# Patient Record
Sex: FEMALE | Race: BLACK OR AFRICAN AMERICAN | NOT HISPANIC OR LATINO | Employment: UNEMPLOYED | URBAN - METROPOLITAN AREA
[De-identification: names, ages, dates, MRNs, and addresses within clinical notes are randomized per-mention and may not be internally consistent; named-entity substitution may affect disease eponyms.]

---

## 2023-10-29 ENCOUNTER — HOSPITAL ENCOUNTER (EMERGENCY)
Facility: CLINIC | Age: 23
Discharge: HOME | End: 2023-10-29
Attending: STUDENT IN AN ORGANIZED HEALTH CARE EDUCATION/TRAINING PROGRAM
Payer: MEDICAID

## 2023-10-29 ENCOUNTER — APPOINTMENT (OUTPATIENT)
Dept: ULTRASOUND IMAGING | Facility: CLINIC | Age: 23
End: 2023-10-29
Attending: STUDENT IN AN ORGANIZED HEALTH CARE EDUCATION/TRAINING PROGRAM
Payer: MEDICAID

## 2023-10-29 VITALS
BODY MASS INDEX: 29.28 KG/M2 | HEIGHT: 68 IN | WEIGHT: 193.2 LBS | RESPIRATION RATE: 18 BRPM | OXYGEN SATURATION: 97 % | DIASTOLIC BLOOD PRESSURE: 75 MMHG | TEMPERATURE: 98.5 F | SYSTOLIC BLOOD PRESSURE: 113 MMHG | HEART RATE: 93 BPM

## 2023-10-29 DIAGNOSIS — O20.0 THREATENED ABORTION: ICD-10-CM

## 2023-10-29 DIAGNOSIS — O20.9 VAGINAL BLEEDING IN PREGNANCY, FIRST TRIMESTER: ICD-10-CM

## 2023-10-29 LAB
ABO/RH(D): NORMAL
ACANTHOCYTES BLD QL SMEAR: NORMAL
ALBUMIN SERPL BCG-MCNC: 4.3 G/DL (ref 3.5–5.2)
ALP SERPL-CCNC: 67 U/L (ref 35–104)
ALT SERPL W P-5'-P-CCNC: 17 U/L (ref 0–50)
ANION GAP SERPL CALCULATED.3IONS-SCNC: 9 MMOL/L (ref 7–15)
ANTIBODY SCREEN: NEGATIVE
AST SERPL W P-5'-P-CCNC: 18 U/L (ref 0–45)
AUER BODIES BLD QL SMEAR: NORMAL
BASO STIPL BLD QL SMEAR: NORMAL
BASOPHILS # BLD AUTO: 0 10E3/UL (ref 0–0.2)
BASOPHILS NFR BLD AUTO: 1 %
BILIRUB SERPL-MCNC: 0.4 MG/DL
BITE CELLS BLD QL SMEAR: NORMAL
BLISTER CELLS BLD QL SMEAR: NORMAL
BUN SERPL-MCNC: 5.7 MG/DL (ref 6–20)
BURR CELLS BLD QL SMEAR: NORMAL
CALCIUM SERPL-MCNC: 9.2 MG/DL (ref 8.6–10)
CHLORIDE SERPL-SCNC: 100 MMOL/L (ref 98–107)
CREAT SERPL-MCNC: 0.4 MG/DL (ref 0.51–0.95)
DACRYOCYTES BLD QL SMEAR: NORMAL
DEPRECATED HCO3 PLAS-SCNC: 24 MMOL/L (ref 22–29)
EGFRCR SERPLBLD CKD-EPI 2021: >90 ML/MIN/1.73M2
ELLIPTOCYTES BLD QL SMEAR: NORMAL
EOSINOPHIL # BLD AUTO: 0.1 10E3/UL (ref 0–0.7)
EOSINOPHIL NFR BLD AUTO: 2 %
ERYTHROCYTE [DISTWIDTH] IN BLOOD BY AUTOMATED COUNT: 20.1 % (ref 10–15)
FRAGMENTS BLD QL SMEAR: NORMAL
GLUCOSE SERPL-MCNC: 96 MG/DL (ref 70–99)
HCG INTACT+B SERPL-ACNC: ABNORMAL MIU/ML
HCT VFR BLD AUTO: 26.9 % (ref 35–47)
HGB BLD-MCNC: 7.3 G/DL (ref 11.7–15.7)
HGB C CRYSTALS: NORMAL
HOWELL-JOLLY BOD BLD QL SMEAR: NORMAL
IMM GRANULOCYTES # BLD: 0 10E3/UL
IMM GRANULOCYTES NFR BLD: 0 %
LYMPHOCYTES # BLD AUTO: 2.4 10E3/UL (ref 0.8–5.3)
LYMPHOCYTES NFR BLD AUTO: 40 %
MCH RBC QN AUTO: 17.3 PG (ref 26.5–33)
MCHC RBC AUTO-ENTMCNC: 27.1 G/DL (ref 31.5–36.5)
MCV RBC AUTO: 64 FL (ref 78–100)
MONOCYTES # BLD AUTO: 0.6 10E3/UL (ref 0–1.3)
MONOCYTES NFR BLD AUTO: 9 %
NEUTROPHILS # BLD AUTO: 2.9 10E3/UL (ref 1.6–8.3)
NEUTROPHILS NFR BLD AUTO: 48 %
NEUTS HYPERSEG BLD QL SMEAR: NORMAL
NRBC # BLD AUTO: 0 10E3/UL
NRBC BLD AUTO-RTO: 0 /100
PLAT MORPH BLD: NORMAL
PLATELET # BLD AUTO: 490 10E3/UL (ref 150–450)
POLYCHROMASIA BLD QL SMEAR: NORMAL
POTASSIUM SERPL-SCNC: 3.7 MMOL/L (ref 3.4–5.3)
PROT SERPL-MCNC: 7.9 G/DL (ref 6.4–8.3)
RBC # BLD AUTO: 4.23 10E6/UL (ref 3.8–5.2)
RBC AGGLUT BLD QL: NORMAL
RBC MORPH BLD: NORMAL
ROULEAUX BLD QL SMEAR: NORMAL
SICKLE CELLS BLD QL SMEAR: NORMAL
SMUDGE CELLS BLD QL SMEAR: NORMAL
SODIUM SERPL-SCNC: 133 MMOL/L (ref 135–145)
SPECIMEN EXPIRATION DATE: NORMAL
SPHEROCYTES BLD QL SMEAR: NORMAL
STOMATOCYTES BLD QL SMEAR: NORMAL
TARGETS BLD QL SMEAR: NORMAL
TOXIC GRANULES BLD QL SMEAR: NORMAL
VARIANT LYMPHS BLD QL SMEAR: NORMAL
WBC # BLD AUTO: 6 10E3/UL (ref 4–11)

## 2023-10-29 PROCEDURE — 80053 COMPREHEN METABOLIC PANEL: CPT | Performed by: STUDENT IN AN ORGANIZED HEALTH CARE EDUCATION/TRAINING PROGRAM

## 2023-10-29 PROCEDURE — 99284 EMERGENCY DEPT VISIT MOD MDM: CPT | Performed by: STUDENT IN AN ORGANIZED HEALTH CARE EDUCATION/TRAINING PROGRAM

## 2023-10-29 PROCEDURE — 250N000011 HC RX IP 250 OP 636: Mod: JZ | Performed by: STUDENT IN AN ORGANIZED HEALTH CARE EDUCATION/TRAINING PROGRAM

## 2023-10-29 PROCEDURE — 85025 COMPLETE CBC W/AUTO DIFF WBC: CPT | Performed by: STUDENT IN AN ORGANIZED HEALTH CARE EDUCATION/TRAINING PROGRAM

## 2023-10-29 PROCEDURE — 96372 THER/PROPH/DIAG INJ SC/IM: CPT | Performed by: STUDENT IN AN ORGANIZED HEALTH CARE EDUCATION/TRAINING PROGRAM

## 2023-10-29 PROCEDURE — 76817 TRANSVAGINAL US OBSTETRIC: CPT

## 2023-10-29 PROCEDURE — 86850 RBC ANTIBODY SCREEN: CPT | Performed by: STUDENT IN AN ORGANIZED HEALTH CARE EDUCATION/TRAINING PROGRAM

## 2023-10-29 PROCEDURE — 36415 COLL VENOUS BLD VENIPUNCTURE: CPT | Performed by: STUDENT IN AN ORGANIZED HEALTH CARE EDUCATION/TRAINING PROGRAM

## 2023-10-29 PROCEDURE — 99285 EMERGENCY DEPT VISIT HI MDM: CPT | Mod: 25 | Performed by: STUDENT IN AN ORGANIZED HEALTH CARE EDUCATION/TRAINING PROGRAM

## 2023-10-29 PROCEDURE — 76801 OB US < 14 WKS SINGLE FETUS: CPT

## 2023-10-29 PROCEDURE — 84702 CHORIONIC GONADOTROPIN TEST: CPT | Performed by: STUDENT IN AN ORGANIZED HEALTH CARE EDUCATION/TRAINING PROGRAM

## 2023-10-29 PROCEDURE — 86901 BLOOD TYPING SEROLOGIC RH(D): CPT | Performed by: STUDENT IN AN ORGANIZED HEALTH CARE EDUCATION/TRAINING PROGRAM

## 2023-10-29 RX ADMIN — HUMAN RHO(D) IMMUNE GLOBULIN 300 MCG: 1500 SOLUTION INTRAMUSCULAR; INTRAVENOUS at 23:38

## 2023-10-29 ASSESSMENT — ACTIVITIES OF DAILY LIVING (ADL)
ADLS_ACUITY_SCORE: 35

## 2023-10-29 NOTE — ED PROVIDER NOTES
"    [facility] EMERGENCY DEPARTMENT ([facility])    10/29/23      ED PROVIDER NOTE   Ashley RIVERA  History     Chief Complaint   Patient presents with    Vaginal Bleeding - Pregnant     Patient states with , \"I am pregnant.\" The patient states to have saw some bleeding, she is around 7 weeks pregnant. A couple days ago she saw a little bit of bleeding, and today she saw it again, but it is not clotted blood. She states to have felt wetness, and wants it checked. Denies abdominal pain.      The history is provided by the patient and medical records.     Frantz Rose is a 22 year old [place] female G1, P0 blood type O- at 7 weeks 2 days gestation based on ultrasound LAURA of 06/14/2024 who presents to the emergency department with vaginal bleeding.  Patient states that she is approximately 7 weeks pregnant, has an ultrasound report from 10/24/2023.  A couple of days ago she had some light spotting, had recurrence of this again today.  She would like to be evaluated.  No abdominal pain, no passage of blood clots.    No prior [facility] records exist for the patient on this chart.  Will look for potential duplicate chart or [facility].    [facility] patient was seen at that emergency department on 10/25/2023.  She reported last menstrual period of 9/3/2023 as well as history of anemia.  She reported having mild lower abdominal pain that started around 10/23/2023 along with breast tenderness.  She underwent ultrasound that showed single viable intrauterine pregnancy. The embryo's crown rump length measurement of 7 mm corresponds to a gestational age of 6 weeks 5 days with a sonographic due date of 06/14/2024.  She had lab work showing O- blood type, quantitative hCG of 43,863.  UA was notable for increased urobilinogen but otherwise negative.  CBC notable for hemoglobin of 7.2, unclear what patient's baseline is.  Unable to " "obtain any records prior to this ED visit that she had 4 days ago.    Past Medical History  No past medical history on file.  No past surgical history on file.  No current outpatient medications on file.    No Known Allergies  Family History  No family history on file.  Social History          A medically appropriate review of systems was performed with pertinent positives and negatives noted in the HPI, and all other systems negative.    Physical Exam   BP: 118/82  Pulse: 93  Temp: 98.5  F (36.9  C)  Resp: 18  Height: 173 cm (5' 8.11\")  Weight: 87.6 kg (193 lb 3.2 oz)  SpO2: 100 %    Physical Exam  GEN: Well appearing, non toxic, cooperative  HEENT: normocephalic and atraumatic, PERRLA, EOMI  CV: well-perfused, normal skin color for ethnicity  PULM: breathing comfortably, in no respiratory distress  ABD: nondistended  EXT: Full range of motion.  No edema.  NEURO: awake, conversant, grossly normal bilateral upper and lower extremity strength & ROM   SKIN: No rashes, ecchymosis, or lacerations  PSYCH: Calm and cooperative, interactive      ED Course, Procedures, & Data      Procedures                 Results for orders placed or performed during the hospital encounter of 10/29/23   US OB < 14 Weeks Single     Status: None    Narrative    EXAM: US OB < 14 WEEKS SINGLE-TRANSABDOMINAL  LOCATION: [facility]  DATE: 10/29/2023    INDICATION: Early pregnancy, abdominal pain.  COMPARISON: None.  TECHNIQUE: Transabdominal scans were performed. Endovaginal ultrasound was performed to better visualize the embryo.    FINDINGS:  UTERUS: Single intrauterine gestation sac.  CRL: Measures 4 cm, equals 6 weeks 1 day.  FETAL HEART RATE: No fetal heart tones.  AMNIOTIC FLUID: Normal.  PLACENTA: Not yet formed. No evidence for sub-chorionic hemorrhage.    RIGHT OVARY: Small cyst.  LEFT OVARY: Not visualized due to overlying bowel gas.      Impression    IMPRESSION:   1.  Single intrauterine " gestation at 6 weeks 1 day, EDC 06/22/2024.    2.  No fetal heart tones. Findings suspicious but not definitive for failed pregnancy. Short-term follow-up recommended.    NOTE: ABNORMAL REPORT    THE DICTATION ABOVE DESCRIBES AN ABNORMALITY FOR WHICH FOLLOW-UP IS NEEDED.        US OB Transvaginal Only     Status: None    Narrative    EXAM: US OB TRANSVAGINAL ONLY  LOCATION: [facility]  DATE: 10/29/2023    INDICATION: vaginal bleeding, possible miscarriage, already had TA, wants transvaginal us now  COMPARISON: Earlier today at 2000 hours  TECHNIQUE: Endovaginal ultrasound was performed to better visualize the embryo.    FINDINGS:  UTERUS: Single normal appearing intrauterine gestation sac.  CRL: Measures 0.5 cm, equals 6 weeks 2 days.  FETAL HEART RATE: Not detected.  AMNIOTIC FLUID: Normal.  PLACENTA: Not yet formed. Tiny sub-chorionic hemorrhage.    Neither ovary identified, obscured by bowel gas. No free fluid.       Impression    IMPRESSION:   1.  No significant change compared to earlier exam. Small 5 mm embryo identified with no cardiac activity evident.  2.  Findings remain suspicious but not yet diagnostic of first trimester demise. As indicated clinically, suggest follow-up exam in one week to reevaluate.    Findings Suspicious, But Not Diagnostic of Pregnancy Failure    CRL< 7mm and no FHR    Reference: Diagnostic Criteria for Nonviable Pregnancy Early in the First Trimester. Ultrasound Quarterly; 30(1): 3-9, March 2014   Comprehensive metabolic panel     Status: Abnormal   Result Value Ref Range    Sodium 133 (L) 135 - 145 mmol/L    Potassium 3.7 3.4 - 5.3 mmol/L    Carbon Dioxide (CO2) 24 22 - 29 mmol/L    Anion Gap 9 7 - 15 mmol/L    Urea Nitrogen 5.7 (L) 6.0 - 20.0 mg/dL    Creatinine 0.40 (L) 0.51 - 0.95 mg/dL    GFR Estimate >90 >60 mL/min/1.73m2    Calcium 9.2 8.6 - 10.0 mg/dL    Chloride 100 98 - 107 mmol/L    Glucose 96 70 - 99 mg/dL    Alkaline  Phosphatase 67 35 - 104 U/L    AST 18 0 - 45 U/L    ALT 17 0 - 50 U/L    Protein Total 7.9 6.4 - 8.3 g/dL    Albumin 4.3 3.5 - 5.2 g/dL    Bilirubin Total 0.4 <=1.2 mg/dL   HCG quantitative pregnancy     Status: Abnormal   Result Value Ref Range    hCG Quantitative 33,388 (H) <5 mIU/mL   CBC with platelets and differential     Status: Abnormal   Result Value Ref Range    WBC Count 6.0 4.0 - 11.0 10e3/uL    RBC Count 4.23 3.80 - 5.20 10e6/uL    Hemoglobin 7.3 (L) 11.7 - 15.7 g/dL    Hematocrit 26.9 (L) 35.0 - 47.0 %    MCV 64 (L) 78 - 100 fL    MCH 17.3 (L) 26.5 - 33.0 pg    MCHC 27.1 (L) 31.5 - 36.5 g/dL    RDW 20.1 (H) 10.0 - 15.0 %    Platelet Count 490 (H) 150 - 450 10e3/uL    % Neutrophils 48 %    % Lymphocytes 40 %    % Monocytes 9 %    % Eosinophils 2 %    % Basophils 1 %    % Immature Granulocytes 0 %    NRBCs per 100 WBC 0 <1 /100    Absolute Neutrophils 2.9 1.6 - 8.3 10e3/uL    Absolute Lymphocytes 2.4 0.8 - 5.3 10e3/uL    Absolute Monocytes 0.6 0.0 - 1.3 10e3/uL    Absolute Eosinophils 0.1 0.0 - 0.7 10e3/uL    Absolute Basophils 0.0 0.0 - 0.2 10e3/uL    Absolute Immature Granulocytes 0.0 <=0.4 10e3/uL    Absolute NRBCs 0.0 10e3/uL   RBC and Platelet Morphology     Status: None   Result Value Ref Range    Platelet Assessment  Automated Count Confirmed. Platelet morphology is normal.     Automated Count Confirmed. Platelet morphology is normal.    Acanthocytes      Onelia Rods      Basophilic Stippling      Bite Cells      Blister Cells      [place] Cells      Elliptocytes      Hgb C Crystals      Vasquez-Jolly Bodies      Hypersegmented Neutrophils      Polychromasia      RBC agglutination      RBC Fragments      Reactive Lymphocytes      Rouleaux      Sickle Cells      Smudge Cells      Spherocytes      Stomatocytes      Target Cells      Teardrop Cells      Toxic Neutrophils      RBC Morphology Confirmed RBC Indices    Adult Type and Screen     Status: None   Result Value Ref Range    ABO/RH(D) O NEG      Antibody Screen Negative Negative    SPECIMEN EXPIRATION DATE [id number]    CBC with platelets differential     Status: Abnormal    Narrative    The following orders were created for panel order CBC with platelets differential.  Procedure                               Abnormality         Status                     ---------                               -----------         ------                     CBC with platelets and d...[[id number]]  Abnormal            Final result               RBC and Platelet Morphology[[id number]]                      Final result                 Please view results for these tests on the individual orders.   ABO/Rh type and screen     Status: None    Narrative    The following orders were created for panel order ABO/Rh type and screen.  Procedure                               Abnormality         Status                     ---------                               -----------         ------                     Adult Type and Screen[[id number]]                            Final result                 Please view results for these tests on the individual orders.     Medications   rho(D) immune globulin (RHOPHYLAC) injection 300 mcg (has no administration in time range)     Or   rho(D) immune globulin (RHOPHYLAC) injection 300 mcg (has no administration in time range)     Labs Ordered and Resulted from Time of ED Arrival to Time of ED Departure   COMPREHENSIVE METABOLIC PANEL - Abnormal       Result Value    Sodium 133 (*)     Potassium 3.7      Carbon Dioxide (CO2) 24      Anion Gap 9      Urea Nitrogen 5.7 (*)     Creatinine 0.40 (*)     GFR Estimate >90      Calcium 9.2      Chloride 100      Glucose 96      Alkaline Phosphatase 67      AST 18      ALT 17      Protein Total 7.9      Albumin 4.3      Bilirubin Total 0.4     HCG QUANTITATIVE PREGNANCY - Abnormal    hCG Quantitative 33,388 (*)    CBC WITH PLATELETS AND DIFFERENTIAL - Abnormal    WBC Count 6.0      RBC Count 4.23       Hemoglobin 7.3 (*)     Hematocrit 26.9 (*)     MCV 64 (*)     MCH 17.3 (*)     MCHC 27.1 (*)     RDW 20.1 (*)     Platelet Count 490 (*)     % Neutrophils 48      % Lymphocytes 40      % Monocytes 9      % Eosinophils 2      % Basophils 1      % Immature Granulocytes 0      NRBCs per 100 WBC 0      Absolute Neutrophils 2.9      Absolute Lymphocytes 2.4      Absolute Monocytes 0.6      Absolute Eosinophils 0.1      Absolute Basophils 0.0      Absolute Immature Granulocytes 0.0      Absolute NRBCs 0.0     RBC AND PLATELET MORPHOLOGY    Platelet Assessment        Value: Automated Count Confirmed. Platelet morphology is normal.    Acanthocytes        Onelia Rods        Basophilic Stippling        Bite Cells        Blister Cells        [place] Cells        Elliptocytes        Hgb C Crystals        Vasquez-Jolly Bodies        Hypersegmented Neutrophils        Polychromasia        RBC agglutination        RBC Fragments        Reactive Lymphocytes        Rouleaux        Sickle Cells        Smudge Cells        Spherocytes        Stomatocytes        Target Cells        Teardrop Cells        Toxic Neutrophils        RBC Morphology Confirmed RBC Indices     ROUTINE UA WITH MICROSCOPIC REFLEX TO CULTURE   TYPE AND SCREEN, ADULT    ABO/RH(D) O NEG      Antibody Screen Negative      SPECIMEN EXPIRATION DATE [id number]     ABO/RH TYPE AND SCREEN     US OB Transvaginal Only   Final Result   IMPRESSION:    1.  No significant change compared to earlier exam. Small 5 mm embryo identified with no cardiac activity evident.   2.  Findings remain suspicious but not yet diagnostic of first trimester demise. As indicated clinically, suggest follow-up exam in one week to reevaluate.      Findings Suspicious, But Not Diagnostic of Pregnancy Failure      CRL< 7mm and no FHR      Reference: Diagnostic Criteria for Nonviable Pregnancy Early in the First Trimester. Ultrasound Quarterly; 30(1): 3-9, March 2014      US OB < 14 Weeks Single   Final  Result   IMPRESSION:    1.  Single intrauterine gestation at 6 weeks 1 day, EDC 06/22/2024.      2.  No fetal heart tones. Findings suspicious but not definitive for failed pregnancy. Short-term follow-up recommended.      NOTE: ABNORMAL REPORT      THE DICTATION ABOVE DESCRIBES AN ABNORMALITY FOR WHICH FOLLOW-UP IS NEEDED.                    Critical care was not performed.     Medical Decision Making  The patient's presentation was of moderate complexity (an undiagnosed new problem with uncertain diagnosis).    The patient's evaluation involved:  review of external note(s) from 1 sources (see separate area of note for details)  review of 3+ test result(s) ordered prior to this encounter (see separate area of note for details)  ordering and/or review of 3+ test(s) in this encounter (see separate area of note for details)    The patient's management necessitated high risk (a decision regarding hospitalization).    Assessment & Plan    Frantz Rose is a 22 year old [place] female G1, P0 blood type O- at 7 weeks 2 days gestation based on ultrasound LAURA of 06/14/2024 who presents to the emergency department with vaginal bleeding.    Patient notes she had some vaginal spotting last week as well as abdominal pain.  She was evaluated and had an ultrasound done 4 days ago which demonstrated an intrauterine pregnancy with a difficult to discern but present fetal heart rate at 150 bpm.    Patient had some further vaginal bleeding today, and at this point in time still highly concerning with no evidence of any fetal heart tones at this time.  My concern is that with his ongoing vaginal bleeding, and inability to find fetal heart tones now that this represents a miscarriage.  Patient is aware of the concern, and will plan to follow-up with her OB/GYN within the next week.    Patient is Rh- and we did discuss the fact that she has significant anemia at baseline and if she has further significant bleeding that her hemoglobin is  already low enough that she may need a blood transfusion.    We will plan for discharge and follow-up with our OB doctors in the next week for further discussion.    I have reviewed the nursing notes. I have reviewed the findings, diagnosis, plan and need for follow up with the patient.    New Prescriptions    No medications on file       Final diagnoses:   Vaginal bleeding in pregnancy, first trimester   Threatened      I, Estefanía Bowers, am serving as a trained medical scribe to document services personally performed by Farideh Rowan MD based on the provider's statements to me on 2023.  This document has been checked and approved by the attending provider.    I, Farideh Rowan MD, was physically present and have reviewed and verified the accuracy of this note documented by Estefanía Bowers, medical scribe.      Farideh Rowan MD     [facility] EMERGENCY DEPARTMENT  10/29/2023     Farideh Rowan MD  10/29/23 3446

## 2023-10-29 NOTE — ED TRIAGE NOTES
"Patient states with , \"I am pregnant.\" The patient states to have saw some bleeding, she is around 7 weeks pregnant. A couple days ago she saw a little bit of bleeding, and today she saw it again, but it is not clotted blood. She states to have felt wetness, and wants it checked. Denies abdominal pain.      Triage Assessment (Adult)       Row Name 10/29/23 7714          Triage Assessment    Airway WDL WDL        Respiratory WDL    Respiratory WDL WDL        Skin Circulation/Temperature WDL    Skin Circulation/Temperature WDL WDL        Cardiac WDL    Cardiac WDL WDL        Peripheral/Neurovascular WDL    Peripheral Neurovascular WDL WDL        Cognitive/Neuro/Behavioral WDL    Cognitive/Neuro/Behavioral WDL WDL                     "

## 2023-10-29 NOTE — ED PROVIDER NOTES
"ED Provider Note  [facility]      History     Chief Complaint   Patient presents with    Vaginal Bleeding - Pregnant     Patient states with , \"I am pregnant.\" The patient states to have saw some bleeding, she is around 7 weeks pregnant. A couple days ago she saw a little bit of bleeding, and today she saw it again, but it is not clotted blood. She states to have felt wetness, and wants it checked. Denies abdominal pain.      HPI  Frantz Rose is a 22 year old G1, P0 at 8 weeks gestation female presenting to the emergency department due to vaginal bleeding.    Patient notes that she was having some abdominal cramping and was actually evaluated in outside ER at which time she had an ultrasound on 10/24, and was told at that time that she had an intrauterine pregnancy with a fetus that was alive and noted to be 7 weeks 2 days.  Notes that shortly after that she had some bright red vaginal spotting for a day.  She did not have an intravaginal  Ultrasound at that time.  The spotting went away, and the abdominal pain overall went away.  However today she also had some bright red spotting again.  No clots, no ongoing bleeding at this time.  No significant abdominal pain, nausea, vomiting, dysuria, constipation.  Does not feel as though she is stating straining to have bowel movements    Past Medical History  No past medical history on file.  No past surgical history on file.  No current outpatient medications on file.    No Known Allergies  Family History  No family history on file.  Social History          A medically appropriate review of systems was performed with pertinent positives and negatives noted in the HPI, and all other systems negative.    Physical Exam   BP: 118/82  Pulse: 93  Temp: 98.5  F (36.9  C)  Resp: 18  Height: 173 cm (5' 8.11\")  Weight: 87.6 kg (193 lb 3.2 oz)  SpO2: 100 %  Physical Exam  GEN: Well appearing, non toxic, cooperative  HEENT: normocephalic and " atraumatic, PERRLA, EOMI  CV: well-perfused, normal skin color for ethnicity, RRR  PULM: breathing comfortably, in no respiratory distress, CABL  ABD: nondistended, Soft, nontender  EXT: Full range of motion.  No edema.  NEURO: awake, conversant, grossly normal bilateral upper and lower extremity strength & ROM   SKIN: No rashes, ecchymosis, or lacerations  PSYCH: Calm and cooperative, interactive      ED Course, Procedures, & Data      Procedures        Results for orders placed or performed during the hospital encounter of 10/29/23   Adult Type and Screen     Status: None (Preliminary result)   Result Value Ref Range    SPECIMEN EXPIRATION DATE [id number]    CBC with platelets differential     Status: None (In process)    Narrative    The following orders were created for panel order CBC with platelets differential.  Procedure                               Abnormality         Status                     ---------                               -----------         ------                     CBC with platelets and d...[[id number]]                      In process                   Please view results for these tests on the individual orders.   ABO/Rh type and screen     Status: None (In process)    Narrative    The following orders were created for panel order ABO/Rh type and screen.  Procedure                               Abnormality         Status                     ---------                               -----------         ------                     Adult Type and Screen[[id number]]                            Preliminary result           Please view results for these tests on the individual orders.     Medications - No data to display  Labs Ordered and Resulted from Time of ED Arrival to Time of ED Departure   COMPREHENSIVE METABOLIC PANEL   ROUTINE UA WITH MICROSCOPIC REFLEX TO CULTURE   HCG QUANTITATIVE PREGNANCY   CBC WITH PLATELETS AND DIFFERENTIAL   TYPE AND SCREEN, ADULT       Result Value    SPECIMEN  EXPIRATION DATE [id number]     ABO/RH TYPE AND SCREEN     US Pelvic Complete w Transvaginal    (Results Pending)   US OB <14 Weeks W Transvaginal    (Results Pending)          Critical care was not performed.     Medical Decision Making  The patient's presentation was of moderate complexity (an undiagnosed new problem with uncertain diagnosis).    The patient's evaluation involved:  review of external note(s) from 1 sources (pt showed me US report on her phone)    The patient's management necessitated only low risk treatment.    Assessment & Plan    22-year-old previously healthy G1, P0 female presenting to the emergency department at 8 weeks gestational age due to vaginal spotting in the setting of her pregnancy.  No significant abdominal tenderness, no evidence of hemodynamic instability and patient is not significantly bleeding at this time.    Unclear cause of spotting at this time.  We will plan for screening labs including urinalysis, and ultrasound at this time.  Patient is declining transvaginal, however her transabdominal ultrasound was able to visualize the fetus previously    Considered either normal vaginal spotting, urinary tract infection that is asymptomatic, impending miscarriage, unlikely hemorrhage    US without heartbeat. Last US was difficult to see HB. Possible early pregnancy vs threatened/missed . Pt aware. I spoke with OB who will plan for eval in the next week. PT aware of return precautions    I have reviewed the nursing notes. I have reviewed the findings, diagnosis, plan and need for follow up with the patient.    New Prescriptions    No medications on file       Final diagnoses:   Vaginal bleeding in pregnancy, first trimester       Farideh Rowan MD  [facility] EMERGENCY DEPARTMENT  10/29/2023     Farideh Rowan MD  10/30/23 0040

## 2023-10-30 ENCOUNTER — TELEPHONE (OUTPATIENT)
Dept: OBGYN | Facility: CLINIC | Age: 23
End: 2023-10-30
Payer: MEDICAID

## 2023-10-30 NOTE — DISCHARGE INSTRUCTIONS
You are seen emergency department due to vaginal bleeding.  As you had a recent ultrasound that showed a growing fetus with heartbeat at that time and today there is no evidence of any heartbeat, I am very concerned that this means that you have had a miscarriage.  Because you already have very low blood counts to begin with, vaginal bleeding that can come with a miscarriage can make you have dangerous levels of your blood counts.  Right now we do not think that you need a blood transfusion, but if you start to have heavy bleeding, bleeding through more than a pad an hour, feeling lightheaded or dizzy, having trouble breathing or feeling very fatigued  related to bleeding we would recommend returning to the emergency department.    Otherwise, we would recommend that you follow-up with her OB doctors within this week who can discuss further what needs to be done including possible need for repeat ultrasounding.

## 2023-10-30 NOTE — TELEPHONE ENCOUNTER
"Received message from provider: \"This patient came into the ED for a missed AB. Can we get her in this week to discuss options. She has anemia at baseline.\"    Routed to  for scheduling.  "

## 2023-10-31 ENCOUNTER — HOSPITAL ENCOUNTER (OUTPATIENT)
Facility: CLINIC | Age: 23
End: 2023-10-31
Attending: OBSTETRICS & GYNECOLOGY | Admitting: OBSTETRICS & GYNECOLOGY
Payer: MEDICAID

## 2023-10-31 ENCOUNTER — OFFICE VISIT (OUTPATIENT)
Dept: OBGYN | Facility: CLINIC | Age: 23
End: 2023-10-31
Attending: OBSTETRICS & GYNECOLOGY
Payer: MEDICAID

## 2023-10-31 ENCOUNTER — TELEPHONE (OUTPATIENT)
Dept: OBGYN | Facility: CLINIC | Age: 23
End: 2023-10-31
Payer: MEDICAID

## 2023-10-31 ENCOUNTER — DOCUMENTATION ONLY (OUTPATIENT)
Dept: OBGYN | Facility: CLINIC | Age: 23
End: 2023-10-31
Payer: MEDICAID

## 2023-10-31 VITALS
WEIGHT: 163.9 LBS | HEART RATE: 91 BPM | BODY MASS INDEX: 24.84 KG/M2 | HEIGHT: 68 IN | DIASTOLIC BLOOD PRESSURE: 78 MMHG | SYSTOLIC BLOOD PRESSURE: 116 MMHG

## 2023-10-31 DIAGNOSIS — O03.9 MISCARRIAGE: Primary | ICD-10-CM

## 2023-10-31 DIAGNOSIS — D50.0 IRON DEFICIENCY ANEMIA DUE TO CHRONIC BLOOD LOSS: ICD-10-CM

## 2023-10-31 PROCEDURE — 99203 OFFICE O/P NEW LOW 30 MIN: CPT | Mod: GC | Performed by: OBSTETRICS & GYNECOLOGY

## 2023-10-31 PROCEDURE — G0463 HOSPITAL OUTPT CLINIC VISIT: HCPCS | Performed by: OBSTETRICS & GYNECOLOGY

## 2023-10-31 RX ORDER — ACETAMINOPHEN 325 MG/1
975 TABLET ORAL ONCE
Status: CANCELLED | OUTPATIENT
Start: 2023-10-31 | End: 2023-10-31

## 2023-10-31 ASSESSMENT — PAIN SCALES - GENERAL: PAINLEVEL: MILD PAIN (2)

## 2023-10-31 NOTE — TELEPHONE ENCOUNTER
Called patient with  on phone to discuss surgery. Notified of surgery date and time. Patient would like to discuss the rest of details with her  on the line. Patient given direct line to call clinic back to talk with surgery scheduler. Will try calling patient back if do not hear from her by end of day  
No

## 2023-10-31 NOTE — PROGRESS NOTES
[facility] CLINIC VISIT    Due to language barrier, Frantz's  acted as an  per her request during the history-taking and subsequent discussion (and for part of the physical exam) with this patient.    SUBJECTIVE:  Frantz Rose is an 22 year old, , with an approximate LMP of 9/3/23 who presents to clinic with missed . She presented to the ED on 10/29 with vaginal bleeding and had an ultrasound done that showed a 5mm embryo with no cardiac activity. Prior to this presentation, she had presented to the ED on 10/25 where an ultrasound was done that showed a single viable intrauterine pregnancy with cardiac activity of approximately 150 beats per minute.     Since she was seen in the ED, she has continued to have vaginal spotting and pelvic cramping. Endorses passing clots the size of a dime, but has not passed any tissue. She endorses that she regularly gets lightheaded and dizzy due to baseline anemia and has had episodes of syncope in the past, most recently in September. No other systemic symptoms.     Blood Type: O Negative  Antibody Screen: Negative  Hgb: 7.3    Imaging: 10/29/23 US OB TRANSVAGINAL ONLY  IMPRESSION:   1.  No significant change compared to earlier exam. Small 5 mm embryo identified with no cardiac activity evident.  2.  Findings remain suspicious but not yet diagnostic of first trimester demise. As indicated clinically, suggest follow-up exam in one week to reevaluate.  Findings Suspicious, But Not Diagnostic of Pregnancy Failure  CRL< 7mm and no FHR    Imaging: 10/25/23 US OB TRANSVAGINAL ONLY  IMPRESSION:  Single viable intrauterine pregnancy. The cardiac activity was difficult to obtain but appears to be 150 beats per minute. Attention to this on follow-up ultrasound will be needed. The gestational age is calculated at 6 weeks 5 days.     HISTORY:  Prenatal Vit-Fe Fumarate-FA (PRENATAL MULTIVITAMIN  PLUS IRON) 27-1 MG TABS, Take by mouth daily    No current  "facility-administered medications on file prior to visit.    No Known Allergies    There is no immunization history on file for this patient.  Patient has had a recent type & screen. Patient is Rh negative. Patient does not need RhoGAM administration given she is in the first trimester.     OB History    Para Term  AB Living   1 0 0 0 0 0   SAB IAB Ectopic Multiple Live Births   0 0 0 0 0     No past medical history on file.  No past surgical history on file.  No family history on file.  Social History     Socioeconomic History    Marital status: Single       ROS   ROS: 10 point ROS neg other than the symptoms noted above in the HPI.      EXAM:  Blood pressure 116/78, pulse 91, height 1.73 m (5' 8.11\"), weight 74.3 kg (163 lb 14.4 oz). Body mass index is 24.84 kg/m .  General - pleasant female in no acute distress.  Lungs - clear to auscultation bilaterally. No wheezes appreciated.   Heart - regular rate and rhythm without murmur.  Abdomen - soft, non-distended, tender to palpation in bilateral lower quadrants, R>L  Musculoskeletal - no gross deformities.  Neurological - normal strength, sensation, and mental status.    Encounter Diagnoses   Name Primary?    Miscarriage Yes    Iron deficiency anemia due to chronic blood loss       ASSESSMENT/ PLAN:   #Missed   - Reviewed ultrasound findings with patient are diagnostic of a missed  given presence of cardiac activity on one ultrasound followed by no cardiac activity on subsequent ultrasound. Patient would like another pelvic ultrasound prior to procedure.   - Discussed that given significant anemia at baseline, medical and expectant management would not be recommended as patient could potentially lose enough blood to necessitate need for transfusion.   - Reviewed surgical management with suction dilation and curettage. Discussed risks of the procedure to include bleeding, infection, and damage to surrounding structures, such as a uterine " perforation. Answered their questions related to surgical  management options.  - Following discussion, patient elected to proceed with surgical management of early pregnancy loss.   - This physical examination can serve as her pre-operative examination.  - Discussed with the patient the ability to conceive after an early pregnancy loss. Patient is not interested in starting a birth control method after completion of the miscarriage. Recommended continuing to take prenatal vitamin with iron.   - Case request sent.     Patient seen and plan of care discussed with Dr. Ortega.    Yasmine Jimenez MD  Obstetrics and Gynecology, PGY-1  11/01/23 6:46 AM

## 2023-10-31 NOTE — LETTER
10/31/2023       RE: Frantz Rose  [address]  [address]  [facility] [address]     Dear Colleague,    Thank you for referring your patient, Frantz Rose, to the [facility] [place] [facility]. Please see a copy of my visit note below.    [facility] CLINIC VISIT    Due to language barrier, Frantz's  acted as an  per her request during the history-taking and subsequent discussion (and for part of the physical exam) with this patient.    SUBJECTIVE:  Frantz Rose is an 22 year old, , with an approximate LMP of 9/3/23 who presents to clinic with missed . She presented to the ED on 10/29 with vaginal bleeding and had an ultrasound done that showed a 5mm embryo with no cardiac activity. Prior to this presentation, she had presented to the ED on 10/25 where an ultrasound was done that showed a single viable intrauterine pregnancy with cardiac activity of approximately 150 beats per minute.     Since she was seen in the ED, she has continued to have vaginal spotting and pelvic cramping. Endorses passing clots the size of a dime, but has not passed any tissue. She endorses that she regularly gets lightheaded and dizzy due to baseline anemia and has had episodes of syncope in the past, most recently in September. No other systemic symptoms.     Blood Type: O Negative  Antibody Screen: Negative  Hgb: 7.3    Imaging: 10/29/23 US OB TRANSVAGINAL ONLY  IMPRESSION:   1.  No significant change compared to earlier exam. Small 5 mm embryo identified with no cardiac activity evident.  2.  Findings remain suspicious but not yet diagnostic of first trimester demise. As indicated clinically, suggest follow-up exam in one week to reevaluate.  Findings Suspicious, But Not Diagnostic of Pregnancy Failure  CRL< 7mm and no FHR    Imaging: 10/25/23 US OB TRANSVAGINAL ONLY  IMPRESSION:  Single viable intrauterine pregnancy. The cardiac activity  "was difficult to obtain but appears to be 150 beats per minute. Attention to this on follow-up ultrasound will be needed. The gestational age is calculated at 6 weeks 5 days.     HISTORY:  Prenatal Vit-Fe Fumarate-FA (PRENATAL MULTIVITAMIN  PLUS IRON) 27-1 MG TABS, Take by mouth daily    No current facility-administered medications on file prior to visit.    No Known Allergies    There is no immunization history on file for this patient.  Patient has had a recent type & screen. Patient is Rh negative. Patient does not need RhoGAM administration given she is in the first trimester.     OB History    Para Term  AB Living   1 0 0 0 0 0   SAB IAB Ectopic Multiple Live Births   0 0 0 0 0     No past medical history on file.  No past surgical history on file.  No family history on file.  Social History     Socioeconomic History    Marital status: Single       ROS   ROS: 10 point ROS neg other than the symptoms noted above in the HPI.      EXAM:  Blood pressure 116/78, pulse 91, height 1.73 m (5' 8.11\"), weight 74.3 kg (163 lb 14.4 oz). Body mass index is 24.84 kg/m .  General - pleasant female in no acute distress.  Lungs - clear to auscultation bilaterally. No wheezes appreciated.   Heart - regular rate and rhythm without murmur.  Abdomen - soft, non-distended, tender to palpation in bilateral lower quadrants, R>L  Musculoskeletal - no gross deformities.  Neurological - normal strength, sensation, and mental status.    Encounter Diagnoses   Name Primary?    Miscarriage Yes    Iron deficiency anemia due to chronic blood loss       ASSESSMENT/ PLAN:   #Missed   - Reviewed ultrasound findings with patient are diagnostic of a missed  given presence of cardiac activity on one ultrasound followed by no cardiac activity on subsequent ultrasound. Patient would like another pelvic ultrasound prior to procedure.   - Discussed that given significant anemia at baseline, medical and expectant management " would not be recommended as patient could potentially lose enough blood to necessitate need for transfusion.   - Reviewed surgical management with suction dilation and curettage. Discussed risks of the procedure to include bleeding, infection, and damage to surrounding structures, such as a uterine perforation. Answered their questions related to surgical  management options.  - Following discussion, patient elected to proceed with surgical management of early pregnancy loss.   - This physical examination can serve as her pre-operative examination.  - Discussed with the patient the ability to conceive after an early pregnancy loss. Patient is not interested in starting a birth control method after completion of the miscarriage. Recommended continuing to take prenatal vitamin with iron.   - Case request sent.     Patient seen and plan of care discussed with Dr. Ortega.    Ysamine Jimenez MD  Obstetrics and Gynecology, PGY-1  23 6:46 AM                       Attestation signed by Migdalia Ortega MD at 11/3/2023  8:22 AM:  OBGYN Attending Addendum     I, Migdalia Ortega, tequila Rose with the resident, Dr. Jimenez, and agree with their findings and plan of care as documented in the above note.    I personally reviewed vitals, meds, exam, imaging, labs.     Key findings: 23 y/o  with a 6wk missed . Significant anemia which she and her  endorse is chronic.     I agree with the plan for surgical management within 3 days via D&C; recommended against expectant management or medical management given her anemia.    Migdalia Ortega MD, MSCI  Date of Service: 10/31/2023

## 2023-10-31 NOTE — PATIENT INSTRUCTIONS
Thank you for trusting us with your care!     If you need to contact us for questions about:  Symptoms, Scheduling & Medical Questions; Non-urgent (2-3 day response) Daiana message, Urgent (needing response today) [phone] (if after 3:30pm next day response)   Prescriptions: Please call your Pharmacy   Billing: [facility] [phone] or [facility]:[phone]

## 2023-10-31 NOTE — PROGRESS NOTES
Patient checked in for her appointment with Dr Ortega today (10/31) and does not have insurance. Email sent to financial counselor to help get patient set up with insurance. Hoping we will hear from them ASAP. Financial counselor provided with contact information to reach patient. Patient does not have ID with them to scan at time of check in either.     [id number]: Financial counselor returned email stating she will reach out to patient.

## 2023-11-01 ENCOUNTER — TELEPHONE (OUTPATIENT)
Dept: OBGYN | Facility: CLINIC | Age: 23
End: 2023-11-01
Payer: MEDICAID